# Patient Record
Sex: FEMALE | Race: WHITE | NOT HISPANIC OR LATINO | URBAN - METROPOLITAN AREA
[De-identification: names, ages, dates, MRNs, and addresses within clinical notes are randomized per-mention and may not be internally consistent; named-entity substitution may affect disease eponyms.]

---

## 2017-03-24 ENCOUNTER — APPOINTMENT (RX ONLY)
Dept: URBAN - METROPOLITAN AREA CLINIC 170 | Facility: CLINIC | Age: 32
Setting detail: DERMATOLOGY
End: 2017-03-24

## 2017-03-24 DIAGNOSIS — Z41.9 ENCOUNTER FOR PROCEDURE FOR PURPOSES OTHER THAN REMEDYING HEALTH STATE, UNSPECIFIED: ICD-10-CM

## 2017-03-24 PROCEDURE — ? FRAXEL

## 2017-03-24 ASSESSMENT — LOCATION DETAILED DESCRIPTION DERM
LOCATION DETAILED: RIGHT INFERIOR LATERAL MALAR CHEEK
LOCATION DETAILED: LEFT INFERIOR CENTRAL MALAR CHEEK

## 2017-03-24 ASSESSMENT — LOCATION ZONE DERM: LOCATION ZONE: FACE

## 2017-03-24 ASSESSMENT — LOCATION SIMPLE DESCRIPTION DERM
LOCATION SIMPLE: RIGHT CHEEK
LOCATION SIMPLE: LEFT CHEEK

## 2017-03-24 NOTE — PROCEDURE: FRAXEL
Total Energy In Kj (Optional- Don't Include Units): 2.32
Location: cheeks
Number Of Passes: 0
Wavelength: 1550nm
Location: perioral area
Number Of Passes: 8
Treatment Level: 4
Total Coverage: 23%
Add Post-Care Below To The Note: No
Consent: Written consent obtained, risks reviewed including but not limited to pain and incomplete improvement of sun damage.
Price (Use Numbers Only, No Special Characters Or $): 400
Treatment Number: 1
Energy(Mj/Cm2): 60
Energy(Mj/Cm2): 40
Detail Level: Zone
Location: lower abdomen
Length Of Topical Anesthesia Application (Optional): 90 minutes
Energy(Mj/Cm2): 40
External Cooling Fan Speed: 6
Post-Care Instructions: I reviewed with the patient in detail post-care instructions. Patient should avoid sun until area fully healed.
External Cooling: Rachel Cryo 6
Indication: acne scars
Topical Anesthesia Type: 23% Lidocaine 7% Tetracaine